# Patient Record
Sex: FEMALE | Race: WHITE | Employment: UNEMPLOYED | URBAN - METROPOLITAN AREA
[De-identification: names, ages, dates, MRNs, and addresses within clinical notes are randomized per-mention and may not be internally consistent; named-entity substitution may affect disease eponyms.]

---

## 2017-03-03 ENCOUNTER — HOSPITAL ENCOUNTER (EMERGENCY)
Facility: CLINIC | Age: 57
Discharge: HOME | End: 2017-03-03
Attending: FAMILY MEDICINE | Admitting: FAMILY MEDICINE
Payer: MEDICARE

## 2017-03-03 VITALS
TEMPERATURE: 96.9 F | DIASTOLIC BLOOD PRESSURE: 86 MMHG | HEART RATE: 87 BPM | OXYGEN SATURATION: 97 % | BODY MASS INDEX: 28.7 KG/M2 | WEIGHT: 200 LBS | SYSTOLIC BLOOD PRESSURE: 144 MMHG | RESPIRATION RATE: 12 BRPM

## 2017-03-03 DIAGNOSIS — K52.9 GASTROENTERITIS: ICD-10-CM

## 2017-03-03 DIAGNOSIS — R42 DIZZINESS: ICD-10-CM

## 2017-03-03 LAB
ALBUMIN SERPL-MCNC: 3.1 G/DL (ref 3.4–5)
ALP SERPL-CCNC: 135 U/L (ref 40–150)
ALT SERPL W P-5'-P-CCNC: 21 U/L (ref 0–50)
ANION GAP SERPL CALCULATED.3IONS-SCNC: 10 MMOL/L (ref 3–14)
AST SERPL W P-5'-P-CCNC: 16 U/L (ref 0–45)
BASOPHILS # BLD AUTO: 0.1 10E9/L (ref 0–0.2)
BASOPHILS NFR BLD AUTO: 0.4 %
BILIRUB SERPL-MCNC: 0.3 MG/DL (ref 0.2–1.3)
BUN SERPL-MCNC: 14 MG/DL (ref 7–30)
C DIFF TOX B STL QL: NORMAL
CALCIUM SERPL-MCNC: 8.3 MG/DL (ref 8.5–10.1)
CAMPYLOBACTER GROUP BY NAT: NOT DETECTED
CHLORIDE SERPL-SCNC: 111 MMOL/L (ref 94–109)
CO2 SERPL-SCNC: 24 MMOL/L (ref 20–32)
CREAT SERPL-MCNC: 0.97 MG/DL (ref 0.52–1.04)
DIFFERENTIAL METHOD BLD: ABNORMAL
ENTERIC PATHOGEN COMMENT: NORMAL
EOSINOPHIL # BLD AUTO: 0.2 10E9/L (ref 0–0.7)
EOSINOPHIL NFR BLD AUTO: 1.3 %
ERYTHROCYTE [DISTWIDTH] IN BLOOD BY AUTOMATED COUNT: 16.3 % (ref 10–15)
GFR SERPL CREATININE-BSD FRML MDRD: 59 ML/MIN/1.7M2
GLUCOSE SERPL-MCNC: 98 MG/DL (ref 70–99)
HCT VFR BLD AUTO: 42 % (ref 35–47)
HGB BLD-MCNC: 13.2 G/DL (ref 11.7–15.7)
IMM GRANULOCYTES # BLD: 0 10E9/L (ref 0–0.4)
IMM GRANULOCYTES NFR BLD: 0.2 %
LYMPHOCYTES # BLD AUTO: 2.9 10E9/L (ref 0.8–5.3)
LYMPHOCYTES NFR BLD AUTO: 23.5 %
MCH RBC QN AUTO: 27.4 PG (ref 26.5–33)
MCHC RBC AUTO-ENTMCNC: 31.4 G/DL (ref 31.5–36.5)
MCV RBC AUTO: 87 FL (ref 78–100)
MONOCYTES # BLD AUTO: 0.8 10E9/L (ref 0–1.3)
MONOCYTES NFR BLD AUTO: 6.1 %
NEUTROPHILS # BLD AUTO: 8.4 10E9/L (ref 1.6–8.3)
NEUTROPHILS NFR BLD AUTO: 68.5 %
NOROVIRUS I AND II BY NAT: NOT DETECTED
PLATELET # BLD AUTO: 355 10E9/L (ref 150–450)
POTASSIUM SERPL-SCNC: 3.8 MMOL/L (ref 3.4–5.3)
PROT SERPL-MCNC: 7.5 G/DL (ref 6.8–8.8)
RBC # BLD AUTO: 4.81 10E12/L (ref 3.8–5.2)
ROTAVIRUS A BY NAT: NOT DETECTED
SALMONELLA SPECIES BY NAT: NOT DETECTED
SHIGA TOXIN 1 GENE BY NAT: NOT DETECTED
SHIGA TOXIN 2 GENE BY NAT: NOT DETECTED
SHIGELLA SP+EIEC IPAH STL QL NAA+PROBE: NOT DETECTED
SODIUM SERPL-SCNC: 145 MMOL/L (ref 133–144)
SPECIMEN SOURCE: NORMAL
TROPONIN I SERPL-MCNC: NORMAL UG/L (ref 0–0.04)
VIBRIO GROUP BY NAT: NOT DETECTED
WBC # BLD AUTO: 12.2 10E9/L (ref 4–11)
YERSINIA ENTEROCOLITICA BY NAT: NOT DETECTED

## 2017-03-03 PROCEDURE — 80053 COMPREHEN METABOLIC PANEL: CPT | Performed by: FAMILY MEDICINE

## 2017-03-03 PROCEDURE — 87506 IADNA-DNA/RNA PROBE TQ 6-11: CPT | Performed by: FAMILY MEDICINE

## 2017-03-03 PROCEDURE — 85025 COMPLETE CBC W/AUTO DIFF WBC: CPT | Performed by: FAMILY MEDICINE

## 2017-03-03 PROCEDURE — 99284 EMERGENCY DEPT VISIT MOD MDM: CPT | Performed by: FAMILY MEDICINE

## 2017-03-03 PROCEDURE — 87493 C DIFF AMPLIFIED PROBE: CPT | Performed by: FAMILY MEDICINE

## 2017-03-03 PROCEDURE — 25900017 H RX MED GY IP 259 OP 259 PS 637: Mod: GY | Performed by: FAMILY MEDICINE

## 2017-03-03 PROCEDURE — 96360 HYDRATION IV INFUSION INIT: CPT

## 2017-03-03 PROCEDURE — 25000128 H RX IP 250 OP 636: Performed by: FAMILY MEDICINE

## 2017-03-03 PROCEDURE — 96361 HYDRATE IV INFUSION ADD-ON: CPT

## 2017-03-03 PROCEDURE — A9270 NON-COVERED ITEM OR SERVICE: HCPCS | Mod: GY | Performed by: FAMILY MEDICINE

## 2017-03-03 PROCEDURE — 84484 ASSAY OF TROPONIN QUANT: CPT | Performed by: FAMILY MEDICINE

## 2017-03-03 PROCEDURE — 99284 EMERGENCY DEPT VISIT MOD MDM: CPT | Mod: 25

## 2017-03-03 PROCEDURE — 93005 ELECTROCARDIOGRAM TRACING: CPT

## 2017-03-03 RX ORDER — LIDOCAINE 40 MG/G
CREAM TOPICAL
Status: DISCONTINUED | OUTPATIENT
Start: 2017-03-03 | End: 2017-03-03 | Stop reason: HOSPADM

## 2017-03-03 RX ORDER — MECLIZINE HYDROCHLORIDE 25 MG/1
25 TABLET ORAL EVERY 6 HOURS PRN
Qty: 15 TABLET | Refills: 1 | Status: SHIPPED | OUTPATIENT
Start: 2017-03-03

## 2017-03-03 RX ORDER — MECLIZINE HYDROCHLORIDE 25 MG/1
25 TABLET ORAL ONCE
Status: COMPLETED | OUTPATIENT
Start: 2017-03-03 | End: 2017-03-03

## 2017-03-03 RX ORDER — SODIUM CHLORIDE 9 MG/ML
1000 INJECTION, SOLUTION INTRAVENOUS CONTINUOUS
Status: DISCONTINUED | OUTPATIENT
Start: 2017-03-03 | End: 2017-03-03 | Stop reason: HOSPADM

## 2017-03-03 RX ADMIN — SODIUM CHLORIDE 500 ML: 9 INJECTION, SOLUTION INTRAVENOUS at 10:26

## 2017-03-03 RX ADMIN — MECLIZINE HYDROCHLORIDE 25 MG: 25 TABLET ORAL at 08:51

## 2017-03-03 RX ADMIN — SODIUM CHLORIDE 1000 ML: 9 INJECTION, SOLUTION INTRAVENOUS at 08:45

## 2017-03-03 NOTE — ED PROVIDER NOTES
History     Chief Complaint   Patient presents with     Dizziness     HPI  Magui Fall is a 56 year old female who presents with dizziness that started upon waking this morning.  Patient got out of bed and was going to the bathroom when she felt dizzy.  She describes symptoms is more vertigo.  She states she's been dealing with a head cold for the last couple of days.  She denies any recent fevers or chills.  She states being up and moving around seems to make the dizziness worse, laying still seems to help somewhat.  She is not taking her morning medications yet.  She's recently been dealing with some diarrhea and she had 2 episodes of vomiting last night.  She denies any recent article holliday use.  She denies a headache, chest pain, or back pain.    I have reviewed the Medications, Allergies, Past Medical and Surgical History, and Social History in the Epic system.    Review of Systems   All other systems reviewed and are negative.      Physical Exam   BP: 120/72  Pulse: 88  Temp: 96.9  F (36.1  C)  Resp: 18  Weight: 90.7 kg (200 lb)  SpO2: 97 %  Physical Exam   Constitutional: No distress.   HENT:   Head: Atraumatic.   Mouth/Throat: No oropharyngeal exudate.   Dry mucous membranes   Eyes: Pupils are equal, round, and reactive to light. No scleral icterus.   nystagmus is noted on exam   Cardiovascular: Normal heart sounds and intact distal pulses.    Pulmonary/Chest: Breath sounds normal. No respiratory distress.   Abdominal: Soft. Bowel sounds are normal. There is no tenderness.   Musculoskeletal: She exhibits no edema or tenderness.   Skin: Skin is warm. No rash noted. She is not diaphoretic.       ED Course     ED Course     Procedures         Results for orders placed or performed during the hospital encounter of 03/03/17   CBC with platelets differential   Result Value Ref Range    WBC 12.2 (H) 4.0 - 11.0 10e9/L    RBC Count 4.81 3.8 - 5.2 10e12/L    Hemoglobin 13.2 11.7 - 15.7 g/dL    Hematocrit 42.0  35.0 - 47.0 %    MCV 87 78 - 100 fl    MCH 27.4 26.5 - 33.0 pg    MCHC 31.4 (L) 31.5 - 36.5 g/dL    RDW 16.3 (H) 10.0 - 15.0 %    Platelet Count 355 150 - 450 10e9/L    Diff Method Automated Method     % Neutrophils 68.5 %    % Lymphocytes 23.5 %    % Monocytes 6.1 %    % Eosinophils 1.3 %    % Basophils 0.4 %    % Immature Granulocytes 0.2 %    Absolute Neutrophil 8.4 (H) 1.6 - 8.3 10e9/L    Absolute Lymphocytes 2.9 0.8 - 5.3 10e9/L    Absolute Monocytes 0.8 0.0 - 1.3 10e9/L    Absolute Eosinophils 0.2 0.0 - 0.7 10e9/L    Absolute Basophils 0.1 0.0 - 0.2 10e9/L    Abs Immature Granulocytes 0.0 0 - 0.4 10e9/L   Comprehensive metabolic panel   Result Value Ref Range    Sodium 145 (H) 133 - 144 mmol/L    Potassium 3.8 3.4 - 5.3 mmol/L    Chloride 111 (H) 94 - 109 mmol/L    Carbon Dioxide 24 20 - 32 mmol/L    Anion Gap 10 3 - 14 mmol/L    Glucose 98 70 - 99 mg/dL    Urea Nitrogen 14 7 - 30 mg/dL    Creatinine 0.97 0.52 - 1.04 mg/dL    GFR Estimate 59 (L) >60 mL/min/1.7m2    GFR Estimate If Black 71 >60 mL/min/1.7m2    Calcium 8.3 (L) 8.5 - 10.1 mg/dL    Bilirubin Total 0.3 0.2 - 1.3 mg/dL    Albumin 3.1 (L) 3.4 - 5.0 g/dL    Protein Total 7.5 6.8 - 8.8 g/dL    Alkaline Phosphatase 135 40 - 150 U/L    ALT 21 0 - 50 U/L    AST 16 0 - 45 U/L   Troponin I   Result Value Ref Range    Troponin I ES  0.000 - 0.045 ug/L     <0.015  The 99th percentile for upper reference range is 0.045 ug/L.  Troponin values in   the range of 0.045 - 0.120 ug/L may be associated with risks of adverse   clinical events.       Medications   lidocaine 1 % 1 mL (not administered)   lidocaine (LMX4) kit (not administered)   sodium chloride (PF) 0.9% PF flush 3 mL (not administered)   sodium chloride (PF) 0.9% PF flush 3 mL (not administered)   0.9% sodium chloride BOLUS (0 mLs Intravenous Stopped 3/3/17 1024)     Followed by   0.9% sodium chloride infusion (not administered)   meclizine (ANTIVERT) tablet 25 mg (25 mg Oral Given 3/3/17 8138)   0.9%  sodium chloride BOLUS (500 mLs Intravenous New Bag 3/3/17 1026)     labs reviewed and were unremarkable.  Patient feels better after the above medications were given.  She was able to ambulate to the bathroom and did much better than earlier.  I think the patient most likely has a viral gastroenteritis she also has some vertigo too.  Certainly her multiple sclerosis could be acting up also..  Since she's feeling better I think it is safe to discharge patient home.  Discharger with some more Antivert for her dizziness.  She's got a continue to push fluids and I will have her follow-up with her doctor on Monday.    Assessments & Plan (with Medical Decision Making)  gastroenteritis, dizziness      I have reviewed the nursing notes.    I have reviewed the findings, diagnosis, plan and need for follow up with the patient.          3/3/2017   [facility] EMERGENCY DEPARTMENT     Elieser Perez MD  03/03/17 8245

## 2017-03-03 NOTE — ED NOTES
[facility]  Hx of MS.  Pt was feeling well until last evening after dinner where she had an episode of emesis.  She slept fine, but woke this am, went to use her walker to use BR, and got dizzy and felt her knees give out.  She was able to crawl to the BR and call for help.  No LOC.  Pt has had some loose stools.  A/o x 3.  IV per medics:  20g IV.  /72  Pulse 88  Temp 96.9  F (36.1  C) (Oral)  Resp 18  Wt 90.7 kg (200 lb)  LMP 02/19/2003  SpO2 97%  BMI 28.7 kg/m2     Primary Survey:    Airway: Vocalization/intact  Breathing: symmetrical, chest wall intact  Circulation: IV:    /72  Pulse 88  Temp 96.9  F (36.1  C) (Oral)  Resp 18  Wt 90.7 kg (200 lb)  LMP 02/19/2003  SpO2 97%  BMI 28.7 kg/m2  Disability:  A/ox 3.  Secondary Survey:  Into gown    Family Present:  No

## 2017-03-03 NOTE — ED AVS SNAPSHOT
[facility] Emergency Department    [address] [place] [address]    Phone:  [phone]    Fax:  [phone]                                       Magui Fall   MRN: [id number]    Department:  [facility] Emergency Department   Date of Visit:  3/3/2017           Patient Information     Date Of Birth          1960        Your diagnoses for this visit were:     Gastroenteritis     Dizziness        You were seen by Elieser Perez MD.      Follow-up Information     Follow up with your doctor. Schedule an appointment as soon as possible for a visit in 3 days.    Why:  If not improving.        Discharge Instructions         Treating Diarrhea  Diarrhea occurs when you have loose, watery, or frequent bowel movements. It is a common problem with many causes. Most cases of diarrhea clear up on their own. But certain cases may need treatment. Be sure to see your health care provider if your symptoms do not improve within a few days.    Getting Relief  Treatment of diarrhea depends on its cause. Diarrhea caused by bacterial or parasite infection is often treated with antibiotics. Diarrhea caused by other factors, such as a stomach virus, often improves with simple home treatment. The tips below may also help relieve your symptoms.    Drink plenty of fluids. This helps prevent too much fluid loss (dehydration). Water, clear soups, and electrolyte solutions are good choices. Avoid alcohol, coffee, tea, and milk. These can make symptoms worse.    Suck on ice chips if drinking makes you queasy.    Return to your normal diet slowly. You may want to eat bland foods at first, such as rice and toast. Also, you may need to avoid certain foods for a while, such as dairy products. These can make symptoms worse. Ask your health care provider if there are any other foods you should avoid.    If you were prescribed antibiotics, take them as directed.    Do not take anti-diarrhea medications  without asking your health care provider first.  Call Your Health Care Provider If You Have:    Fever of 102 F (38.0 C) or higher    Severe pain    Worsening diarrhea or diarrhea for more than 2 days    Bloody vomit or stool    Signs of dehydration (dizziness, dry mouth and tongue, rapid pulse, dark urine)    1226-7108 [employer]. [address] [place] [address]. All rights reserved. This information is not intended as a substitute for professional medical care. Always follow your healthcare professional's instructions.          Diet for Vomiting or Diarrhea (Adult)    If your symptoms return or get worse after eating certain foods listed below, you should stop eating them until your symptoms ease and you feel better.  Once the vomiting stops, then follow the steps below.   During the first 12 to 24 hours  During the first 12 to 24 hours, follow this diet:    Beverages. Plain water, sport drinks like electrolyte solutions, soft drinks without caffeine, mineral water (plain or flavored), clear fruit juices, and decaffeinated tea and coffee.    Soups. Clear broth, consommé, and bouillon.    Desserts. Plain gelatin, popsicles, and fruit juice bars. As you feel better, you may add 6 to 8 ounces of yogurt per day. If you have diarrhea, don't have foods or beverages that contain sugar, high-fructose corn syrup, or sugar alcohols.  During the next 24 hours  During the next 24 hours you may add the following to the above:    Hot cereal, plain toast, bread, rolls, and crackers    Plain noodles, rice, mashed potatoes, and chicken noodle or rice soup    Unsweetened canned fruit (but not pineapple) and bananas  Don't have more than 15 grams of fat a day. Do this by staying away from margarine, butter, oils, mayonnaise, sauces, gravies, fried foods, peanut butter, meat, poultry, and fish.  Don't eat much fiber. Stay away from raw or cooked vegetables, fresh fruits (except bananas), and bran  cereals.  Limit how much caffeine and chocolate you have. Do not use any spices or seasonings except salt.  During the next 24 hours  Gradually go back to your normal diet, as you feel better and your symptoms ease.    0719-5752 [employer]. [address] [place] [address]. All rights reserved. This information is not intended as a substitute for professional medical care. Always follow your healthcare professional's instructions.          Managing Dizziness (Vertigo) with Medications  Although medications can't cure your problem, they can help control symptoms. Your doctor may prescribe medications for a few weeks and then taper them off.  If you have side effects from your medications, contact your healthcare provider right away.   How medications can help      Treat infection or inflammation. If you have a bacterial infection, your doctor can prescribe antibiotics.    Limit conflicting balance signals. These medications are often in pill form.    Ease nausea. Suppositories, pills, or shots may be used to reduce vomiting.    Reduce pressure in the canals. Diuretics can be used to treat Meniere's disease. These medications help rid the body of excess fluid.    Ease other symptoms. Other medications can help ease depression and anxiety caused by living with dizziness or fainting.    2948-5834 [employer]. [address] [place] [address]. All rights reserved. This information is not intended as a substitute for professional medical care. Always follow your healthcare professional's instructions.          24 Hour Appointment Hotline       To make an appointment at any [facility], call [phone] ([phone]). If you don't have a family doctor or clinic, we will help you find one. [facility] are conveniently located to serve the needs of you and your family.             Review of your medicines      START taking        Dose / Directions Last dose taken     meclizine 25 MG tablet   Commonly known as:  ANTIVERT   Dose:  25 mg   Quantity:  15 tablet        Take 1 tablet (25 mg) by mouth every 6 hours as needed for dizziness   Refills:  1          Our records show that you are taking the medicines listed below. If these are incorrect, please call your family doctor or clinic.        Dose / Directions Last dose taken    aspirin 81 MG EC tablet   Dose:  81 mg   Quantity:  1 tablet        Take 1 tablet by mouth daily.   Refills:  0        cyclobenzaprine 10 MG tablet   Commonly known as:  FLEXERIL   Dose:  10 mg   Quantity:  90 tablet        Take 1 tablet (10 mg) by mouth 3 times daily as needed for muscle spasms   Refills:  5        diclofenac 50 MG EC tablet   Commonly known as:  VOLTAREN   Dose:  50 mg   Quantity:  90 tablet        Take 1 tablet (50 mg) by mouth 3 times daily   Refills:  5        FLUoxetine 40 MG capsule   Commonly known as:  PROzac   Dose:  40 mg   Quantity:  90 capsule        Take 1 capsule (40 mg) by mouth daily   Refills:  1        HYDROcodone-acetaminophen  MG per tablet   Commonly known as:  NORCO   Dose:  1-2 tablet   Quantity:  120 tablet        Take 1-2 tablets by mouth every 6 hours as needed for moderate to severe pain No more than 4 per day   Refills:  0        omeprazole 20 MG CR capsule   Commonly known as:  priLOSEC   Dose:  20 mg   Quantity:  30 capsule        Take 1 capsule (20 mg) by mouth daily   Refills:  prn        * order for DME   Quantity:  1 each        Magui needs an electric adjustable bed.  Head and legs should be able to be adjusted independently.   Refills:  0        * order for DME   Quantity:  1 Device        Shower chair with hand rails   Refills:  0        * order for DME   Quantity:  2 Device        2 hand holds for shower   Refills:  0        order for DME   Quantity:  1 Device        Equipment being ordered: 4 wheeled walker with seat   Refills:  0        oxybutynin 5 MG tablet   Commonly known as:  DITROPAN    Dose:  5 mg   Quantity:  90 tablet        Take 1 tablet (5 mg) by mouth 3 times daily   Refills:  5        POISE PANTILINERS Pads   Dose:  1 pad   Quantity:  120 each        1 pad as needed.   Refills:  0        pregabalin 100 MG capsule   Commonly known as:  LYRICA   Dose:  100 mg   Quantity:  90 capsule        Take 1 capsule (100 mg) by mouth 3 times daily neurontin failed.   Refills:  0        WINGS INCONTINENCE PANTS L/XL Misc   Dose:  1 each   Quantity:  50 each        1 each as needed   Refills:  prn        * Notice:  This list has 3 medication(s) that are the same as other medications prescribed for you. Read the directions carefully, and ask your doctor or other care provider to review them with you.            Prescriptions were sent or printed at these locations (1 Prescription)                   Other Prescriptions                Printed at Department/Unit printer (1 of 1)         meclizine (ANTIVERT) 25 MG tablet                Procedures and tests performed during your visit     CBC with platelets differential    Cardiac Continuous Monitoring    Clostridium difficile toxin B PCR    Comprehensive metabolic panel    EKG 12-lead, tracing only    Enteric Bacteria and Virus Panel by MAISHA Stool    Orthostatic blood pressure and pulse    Peripheral IV catheter    Troponin I      Orders Needing Specimen Collection     None      Pending Results     Date and Time Order Name Status Description    3/3/2017 0955 Clostridium difficile toxin B PCR In process     3/3/2017 0955 Enteric Bacteria and Virus Panel by MAISHA Stool In process             Pending Culture Results     Date and Time Order Name Status Description    3/3/2017 0955 Clostridium difficile toxin B PCR In process     3/3/2017 0955 Enteric Bacteria and Virus Panel by MAISHA Stool In process             Thank you for choosing [facility]       Thank you for choosing [facility] for your care. Our goal is always to provide you with excellent care. Hearing back from  "our patients is one way we can continue to improve our services. Please take a few minutes to complete the written survey that you may receive in the mail after you visit with us. Thank you!        [employer] Information     [employer] lets you send messages to your doctor, view your test results, renew your prescriptions, schedule appointments and more. To sign up, go to [email or web address] . Click on \"Log in\" on the left side of the screen, which will take you to the Welcome page. Then click on \"Sign up Now\" on the right side of the page.     You will be asked to enter the access code listed below, as well as some personal information. Please follow the directions to create your username and password.     Your access code is: [email or web address]  Expires: 2017 12:00 PM     Your access code will  in 90 days. If you need help or a new code, please call your [facility] clinic or [phone].        Care EveryWhere ID     This is your Care EveryWhere ID. This could be used by other organizations to access your [facility] medical records  [id number]        After Visit Summary       This is your record. Keep this with you and show to your community pharmacist(s) and doctor(s) at your next visit.                  "

## 2017-03-03 NOTE — DISCHARGE INSTRUCTIONS
Treating Diarrhea  Diarrhea occurs when you have loose, watery, or frequent bowel movements. It is a common problem with many causes. Most cases of diarrhea clear up on their own. But certain cases may need treatment. Be sure to see your health care provider if your symptoms do not improve within a few days.    Getting Relief  Treatment of diarrhea depends on its cause. Diarrhea caused by bacterial or parasite infection is often treated with antibiotics. Diarrhea caused by other factors, such as a stomach virus, often improves with simple home treatment. The tips below may also help relieve your symptoms.    Drink plenty of fluids. This helps prevent too much fluid loss (dehydration). Water, clear soups, and electrolyte solutions are good choices. Avoid alcohol, coffee, tea, and milk. These can make symptoms worse.    Suck on ice chips if drinking makes you queasy.    Return to your normal diet slowly. You may want to eat bland foods at first, such as rice and toast. Also, you may need to avoid certain foods for a while, such as dairy products. These can make symptoms worse. Ask your health care provider if there are any other foods you should avoid.    If you were prescribed antibiotics, take them as directed.    Do not take anti-diarrhea medications without asking your health care provider first.  Call Your Health Care Provider If You Have:    Fever of 102 F (38.0 C) or higher    Severe pain    Worsening diarrhea or diarrhea for more than 2 days    Bloody vomit or stool    Signs of dehydration (dizziness, dry mouth and tongue, rapid pulse, dark urine)    [id number] [employer]. [address] [place] [address], [place] [address]. All rights reserved. This information is not intended as a substitute for professional medical care. Always follow your healthcare professional's instructions.          Diet for Vomiting or Diarrhea (Adult)    If your symptoms return or get worse after eating certain foods listed  below, you should stop eating them until your symptoms ease and you feel better.  Once the vomiting stops, then follow the steps below.   During the first 12 to 24 hours  During the first 12 to 24 hours, follow this diet:    Beverages. Plain water, sport drinks like electrolyte solutions, soft drinks without caffeine, mineral water (plain or flavored), clear fruit juices, and decaffeinated tea and coffee.    Soups. Clear broth, consommé, and bouillon.    Desserts. Plain gelatin, popsicles, and fruit juice bars. As you feel better, you may add 6 to 8 ounces of yogurt per day. If you have diarrhea, don't have foods or beverages that contain sugar, high-fructose corn syrup, or sugar alcohols.  During the next 24 hours  During the next 24 hours you may add the following to the above:    Hot cereal, plain toast, bread, rolls, and crackers    Plain noodles, rice, mashed potatoes, and chicken noodle or rice soup    Unsweetened canned fruit (but not pineapple) and bananas  Don't have more than 15 grams of fat a day. Do this by staying away from margarine, butter, oils, mayonnaise, sauces, gravies, fried foods, peanut butter, meat, poultry, and fish.  Don't eat much fiber. Stay away from raw or cooked vegetables, fresh fruits (except bananas), and bran cereals.  Limit how much caffeine and chocolate you have. Do not use any spices or seasonings except salt.  During the next 24 hours  Gradually go back to your normal diet, as you feel better and your symptoms ease.    7177-9251 The [employer]. [address] [place] [address]. All rights reserved. This information is not intended as a substitute for professional medical care. Always follow your healthcare professional's instructions.          Managing Dizziness (Vertigo) with Medications  Although medications can't cure your problem, they can help control symptoms. Your doctor may prescribe medications for a few weeks and then taper them off.  If you have  side effects from your medications, contact your healthcare provider right away.   How medications can help      Treat infection or inflammation. If you have a bacterial infection, your doctor can prescribe antibiotics.    Limit conflicting balance signals. These medications are often in pill form.    Ease nausea. Suppositories, pills, or shots may be used to reduce vomiting.    Reduce pressure in the canals. Diuretics can be used to treat Meniere's disease. These medications help rid the body of excess fluid.    Ease other symptoms. Other medications can help ease depression and anxiety caused by living with dizziness or fainting.    0334-1953 [employer]. [address] [place] [address]. All rights reserved. This information is not intended as a substitute for professional medical care. Always follow your healthcare professional's instructions.

## 2017-03-03 NOTE — ED AVS SNAPSHOT
[facility] Emergency Department    [address] [place] [address]    Phone:  [phone]    Fax:  [phone]                                       Magui Fall   MRN: [id number]    Department:  [facility] Emergency Department   Date of Visit:  3/3/2017           After Visit Summary Signature Page     I have received my discharge instructions, and my questions have been answered. I have discussed any challenges I see with this plan with the nurse or doctor.    ..........................................................................................................................................  Patient/Patient Representative Signature      ..........................................................................................................................................  Patient Representative Print Name and Relationship to Patient    ..................................................               ................................................  Date                                            Time    ..........................................................................................................................................  Reviewed by Signature/Title    ...................................................              ..............................................  Date                                                            Time

## 2017-03-08 ENCOUNTER — OFFICE VISIT (OUTPATIENT)
Dept: FAMILY MEDICINE | Facility: OTHER | Age: 57
End: 2017-03-08
Payer: MEDICARE

## 2017-03-08 VITALS
DIASTOLIC BLOOD PRESSURE: 80 MMHG | HEIGHT: 68 IN | BODY MASS INDEX: 36.31 KG/M2 | HEART RATE: 80 BPM | SYSTOLIC BLOOD PRESSURE: 138 MMHG | WEIGHT: 239.6 LBS | TEMPERATURE: 95.8 F

## 2017-03-08 DIAGNOSIS — R42 DIZZINESS: Primary | ICD-10-CM

## 2017-03-08 DIAGNOSIS — H81.13 BPPV (BENIGN PAROXYSMAL POSITIONAL VERTIGO), BILATERAL: ICD-10-CM

## 2017-03-08 DIAGNOSIS — H65.93 BILATERAL NON-SUPPURATIVE OTITIS MEDIA: ICD-10-CM

## 2017-03-08 PROCEDURE — 99214 OFFICE O/P EST MOD 30 MIN: CPT | Performed by: PHYSICIAN ASSISTANT

## 2017-03-08 RX ORDER — MECLIZINE HYDROCHLORIDE 25 MG/1
25 TABLET ORAL EVERY 6 HOURS PRN
Qty: 30 TABLET | Refills: 1 | Status: SHIPPED | OUTPATIENT
Start: 2017-03-08

## 2017-03-08 ASSESSMENT — PAIN SCALES - GENERAL: PAINLEVEL: MODERATE PAIN (4)

## 2017-03-08 NOTE — NURSING NOTE
"Chief Complaint   Patient presents with     ER F/U       Initial /80 (BP Location: Right arm, Patient Position: Chair, Cuff Size: Adult Large)  Pulse 80  Temp 95.8  F (35.4  C) (Oral)  Ht 5' 8.25\" (1.734 m)  Wt 239 lb 9.6 oz (108.7 kg)  LMP 02/19/2003  HC 20\" (50.8 cm)  BMI 36.16 kg/m2 Estimated body mass index is 36.16 kg/(m^2) as calculated from the following:    Height as of this encounter: 5' 8.25\" (1.734 m).    Weight as of this encounter: 239 lb 9.6 oz (108.7 kg).  Medication Reconciliation: joseline GRIFFIN LPN      "

## 2017-03-08 NOTE — MR AVS SNAPSHOT
After Visit Summary   3/8/2017    Magui Fall    MRN: [id number]           Patient Information     Date Of Birth          1960        Visit Information        Provider Department      3/8/2017 1:20 PM Kd Peres PA-C [facility]        Today's Diagnoses     Dizziness    -  1    Bilateral non-suppurative otitis media        BPPV (benign paroxysmal positional vertigo), bilateral           Follow-ups after your visit        Additional Services     PHYSICAL THERAPY REFERRAL       *This therapy referral will be filtered to a centralized scheduling office at [facility] and the patient will receive a call to schedule an appointment at a [place] location most convenient for them. *     [facility] provides Physical Therapy evaluation and treatment and many specialty services across the [place] system.  If requesting a specialty program, please choose from the list below.    If you have not heard from the scheduling office within 2 business days, please call [phone] for all locations, with the exception of [place], please call [phone].  Treatment: Evaluation & Treatment  Special Instructions/Modalities: vertigo  Special Programs: Balance/Vestibular    Please be aware that coverage of these services is subject to the terms and limitations of your health insurance plan.  Call member services at your health plan with any benefit or coverage questions.      **Note to Provider:  If you are referring outside of [place] for the therapy appointment, please list the name of the location in the  special instructions  above, print the referral and give to the patient to schedule the appointment.                  Who to contact     If you have questions or need follow up information about today's clinic visit or your schedule please contact [facility] directly at [phone].  Normal or non-critical lab and imaging results will be  "communicated to you by MyChart, letter or phone within 4 business days after the clinic has received the results. If you do not hear from us within 7 days, please contact the clinic through [employer] or phone. If you have a critical or abnormal lab result, we will notify you by phone as soon as possible.  Submit refill requests through [employer] or call your pharmacy and they will forward the refill request to us. Please allow 3 business days for your refill to be completed.          Additional Information About Your Visit        [employer] Information     [employer] lets you send messages to your doctor, view your test results, renew your prescriptions, schedule appointments and more. To sign up, go to [email or web address] . Click on \"Log in\" on the left side of the screen, which will take you to the Welcome page. Then click on \"Sign up Now\" on the right side of the page.     You will be asked to enter the access code listed below, as well as some personal information. Please follow the directions to create your username and password.     Your access code is: [email or web address]  Expires: 2017 12:00 PM     Your access code will  in 90 days. If you need help or a new code, please call your [place] clinic or [phone].        Care EveryWhere ID     This is your Care EveryWhere ID. This could be used by other organizations to access your [facility] medical records  [id number]        Your Vitals Were     Pulse Temperature Height Last Period Head Circumference BMI (Body Mass Index)    80 95.8  F (35.4  C) (Oral) 5' 8.25\" (1.734 m) 2003 20\" (50.8 cm) 36.16 kg/m2       Blood Pressure from Last 3 Encounters:   17 138/80   17 144/86   10/10/16 110/80    Weight from Last 3 Encounters:   17 239 lb 9.6 oz (108.7 kg)   17 200 lb (90.7 kg)   10/10/16 209 lb (94.8 kg)              We Performed the Following     PHYSICAL THERAPY REFERRAL          Today's Medication Changes          These changes are " accurate as of: 3/8/17  1:41 PM.  If you have any questions, ask your nurse or doctor.               Start taking these medicines.        Dose/Directions    amoxicillin-clavulanate 875-125 MG per tablet   Commonly known as:  AUGMENTIN   Used for:  Dizziness, Bilateral non-suppurative otitis media, BPPV (benign paroxysmal positional vertigo), bilateral   Started by:  Kd Peres PA-C        Dose:  1 tablet   Take 1 tablet by mouth 2 times daily for 10 days   Quantity:  20 tablet   Refills:  0         These medicines have changed or have updated prescriptions.        Dose/Directions    * meclizine 25 MG tablet   Commonly known as:  ANTIVERT   This may have changed:  Another medication with the same name was added. Make sure you understand how and when to take each.        Dose:  25 mg   Take 1 tablet (25 mg) by mouth every 6 hours as needed for dizziness   Quantity:  15 tablet   Refills:  1       * meclizine 25 MG tablet   Commonly known as:  ANTIVERT   This may have changed:  You were already taking a medication with the same name, and this prescription was added. Make sure you understand how and when to take each.   Used for:  Dizziness   Changed by:  Kd Peres PA-C        Dose:  25 mg   Take 1 tablet (25 mg) by mouth every 6 hours as needed for dizziness   Quantity:  30 tablet   Refills:  1       * Notice:  This list has 2 medication(s) that are the same as other medications prescribed for you. Read the directions carefully, and ask your doctor or other care provider to review them with you.         Where to get your medicines      These medications were sent to [place], [place] - [address]   [address] [facility] [address]     Phone:  [phone]     amoxicillin-clavulanate 875-125 MG per tablet    meclizine 25 MG tablet                Primary Care Provider    [facility]                Goals        General    I need to find a new place to live. started 12-31-15 (pt-stated)      Notes - Note created  12/31/2015  1:27 PM by Shawnee Novak LSW    As of today's date 12/31/2015 goal is met at 0 - 25%.   Goal Status:  Active        I want to be able to get/afford my medications.  started 12-31-15 (pt-stated)     Notes - Note created  12/31/2015  1:28 PM by Shawnee Novak LSW    As of today's date 12/31/2015 goal is met at 26 - 50%.   Goal Status:  Active          Thank you!     Thank you for choosing [facility]  for your care. Our goal is always to provide you with excellent care. Hearing back from our patients is one way we can continue to improve our services. Please take a few minutes to complete the written survey that you may receive in the mail after your visit with us. Thank you!             Your Updated Medication List - Protect others around you: Learn how to safely use, store and throw away your medicines at www.disposemymeds.org.          This list is accurate as of: 3/8/17  1:41 PM.  Always use your most recent med list.                   Brand Name Dispense Instructions for use    amoxicillin-clavulanate 875-125 MG per tablet    AUGMENTIN    20 tablet    Take 1 tablet by mouth 2 times daily for 10 days       aspirin 81 MG EC tablet     1 tablet    Take 1 tablet by mouth daily.       cyclobenzaprine 10 MG tablet    FLEXERIL    90 tablet    Take 1 tablet (10 mg) by mouth 3 times daily as needed for muscle spasms       diclofenac 50 MG EC tablet    VOLTAREN    90 tablet    Take 1 tablet (50 mg) by mouth 3 times daily       FLUoxetine 40 MG capsule    PROzac    90 capsule    Take 1 capsule (40 mg) by mouth daily       HYDROcodone-acetaminophen  MG per tablet    NORCO    120 tablet    Take 1-2 tablets by mouth every 6 hours as needed for moderate to severe pain No more than 4 per day       * meclizine 25 MG tablet    ANTIVERT    15 tablet    Take 1 tablet (25 mg) by mouth every 6 hours as needed for dizziness       * meclizine 25 MG tablet    ANTIVERT    30  tablet    Take 1 tablet (25 mg) by mouth every 6 hours as needed for dizziness       omeprazole 20 MG CR capsule    priLOSEC    30 capsule    Take 1 capsule (20 mg) by mouth daily       * order for DME     1 each    [facility] needs an electric adjustable bed.  Head and legs should be able to be adjusted independently.       * order for DME     1 Device    Shower chair with hand rails       * order for DME     2 Device    2 hand holds for shower       order for DME     1 Device    Equipment being ordered: 4 wheeled walker with seat       oxybutynin 5 MG tablet    DITROPAN    90 tablet    Take 1 tablet (5 mg) by mouth 3 times daily       POISE PANTILINERS Pads     120 each    1 pad as needed.       pregabalin 100 MG capsule    LYRICA    90 capsule    Take 1 capsule (100 mg) by mouth 3 times daily neurontin failed.       WINGS INCONTINENCE PANTS L/XL Misc     50 each    1 each as needed       * Notice:  This list has 5 medication(s) that are the same as other medications prescribed for you. Read the directions carefully, and ask your doctor or other care provider to review them with you.
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
